# Patient Record
Sex: FEMALE
[De-identification: names, ages, dates, MRNs, and addresses within clinical notes are randomized per-mention and may not be internally consistent; named-entity substitution may affect disease eponyms.]

---
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
## 2023-12-28 PROBLEM — Z00.00 ENCOUNTER FOR PREVENTIVE HEALTH EXAMINATION: Status: ACTIVE | Noted: 2023-12-28

## 2024-01-02 ENCOUNTER — APPOINTMENT (OUTPATIENT)
Dept: ORTHOPEDIC SURGERY | Facility: CLINIC | Age: 19
End: 2024-01-02
Payer: COMMERCIAL

## 2024-01-02 VITALS — HEIGHT: 63 IN | WEIGHT: 110 LBS | BODY MASS INDEX: 19.49 KG/M2

## 2024-01-02 DIAGNOSIS — M54.2 CERVICALGIA: ICD-10-CM

## 2024-01-02 PROCEDURE — 72040 X-RAY EXAM NECK SPINE 2-3 VW: CPT

## 2024-01-02 PROCEDURE — 99203 OFFICE O/P NEW LOW 30 MIN: CPT

## 2024-01-02 NOTE — IMAGING
[Straightening consistent with spasm] : Straightening consistent with spasm [No spinal deformity, fracture, lytic lesion, or marked single level collapse] : No spinal deformity, fracture, lytic lesion, or marked single level collapse [de-identified] : jhoana trap spasms. No tenderness in the paracervical, trap region.  jhoana rhomboid spasms  full ROM without pain.  motor exam 5/5 strength bilaterally sensory intact negative hoffmans negative spurlings

## 2024-01-02 NOTE — HISTORY OF PRESENT ILLNESS
[6] : 6 [7] : 7 [Constant] : constant [de-identified] : 17 yo F presenting with neck/mid back pain x Spring 2023, no injury. No arm pain. No n/t. Denies specific aggravating or alleviating factors. Headaches at times. Advil prn helps.  No prior neck or back issues/work ups or treatments. Denies nocturnal awakening. States most of her discomfort is in the morning after waking up. Denies fine motor difficulties. Denies gait/balance issues.  pmhx- denies. Unsure of family rheum hx.  [] : no [FreeTextEntry5] : No reported injury, pain started last year and has been constant. Patient denies numbness/tingling.

## 2025-07-12 NOTE — ASSESSMENT
You were seen and evaluated in the emergency department for your ear pain nausea vomiting.  Your blood work was without evidence of heart disease, pancreatitis.  You did have slightly low potassium which is likely from your diarrhea.  This should resolve.    Please be present to your primary care doctor as no visit to the ED is complete without visit to them.  Please schedule for within the next week.    Please schedule with a gastroenterologist as your description of pain in your esophagus while eating could be esophageal spasm and he might be able to help.    Please return to the ED if you develop abdominal pain inability eat or drink chest pain or symptoms that concern you.   [FreeTextEntry1] : 17 yo F with cervicalgia and paraspinal muscle spasms. XRs without fractures, lesions or instability. Recommend to begin PT. Continue nsaids prn. f/u in 1 month for re-eval, if symptoms persist despite this will get MRI CS for further eval. Progress note completed by Lyndsey Mckay PA-C